# Patient Record
Sex: MALE | Race: WHITE | URBAN - METROPOLITAN AREA
[De-identification: names, ages, dates, MRNs, and addresses within clinical notes are randomized per-mention and may not be internally consistent; named-entity substitution may affect disease eponyms.]

---

## 2018-06-07 ENCOUNTER — OFFICE VISIT (OUTPATIENT)
Dept: PEDIATRICS | Facility: CLINIC | Age: 43
End: 2018-06-07
Payer: COMMERCIAL

## 2018-06-07 VITALS
HEART RATE: 69 BPM | TEMPERATURE: 98.6 F | WEIGHT: 241.2 LBS | DIASTOLIC BLOOD PRESSURE: 86 MMHG | SYSTOLIC BLOOD PRESSURE: 138 MMHG | OXYGEN SATURATION: 99 %

## 2018-06-07 DIAGNOSIS — L91.0 KELOID SCAR: Primary | ICD-10-CM

## 2018-06-07 DIAGNOSIS — Z13.1 SCREENING FOR DIABETES MELLITUS: ICD-10-CM

## 2018-06-07 DIAGNOSIS — B35.1 ONYCHOMYCOSIS: ICD-10-CM

## 2018-06-07 DIAGNOSIS — Z13.6 CARDIOVASCULAR SCREENING; LDL GOAL LESS THAN 160: ICD-10-CM

## 2018-06-07 PROCEDURE — 99203 OFFICE O/P NEW LOW 30 MIN: CPT | Performed by: NURSE PRACTITIONER

## 2018-06-07 RX ORDER — TERBINAFINE HYDROCHLORIDE 250 MG/1
250 TABLET ORAL DAILY
Qty: 90 TABLET | Refills: 0 | Status: SHIPPED | OUTPATIENT
Start: 2018-06-07
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
## 2018-06-07 NOTE — MR AVS SNAPSHOT
After Visit Summary   6/7/2018    Pablo Busch    MRN: [id number]           Patient Information     Date Of Birth          1975        Visit Information        Provider Department      6/7/2018 4:10 PM Rosalva Sanchez APRN CNP [facility]        Today's Diagnoses     Onychomycosis    -  1    Screening for diabetes mellitus        CARDIOVASCULAR SCREENING; LDL GOAL LESS THAN 160        Keloid scar          Care Instructions    PLAN:   1.   Orders Placed This Encounter   Medications     terbinafine (LAMISIL) 250 MG tablet     Sig: Take 1 tablet (250 mg) by mouth daily     Dispense:  90 tablet     Refill:  0     Orders Placed This Encounter   Procedures     Comprehensive metabolic panel     Lipid panel reflex to direct LDL Fasting     PLASTIC SURGERY REFERRAL       2. Patient needs to follow up in if no improvement,or sooner if worsening of symptoms or other symptoms develop.  CONSULTATION/REFERRAL to plastic surgery  Do not start on fungal med until after we call you about your labs    It was a pleasure seeing you today at the [facility]. Thank you for allowing us to care for you today. We truly hope we provided you with the excellent service you deserve. Please let us know if there is anything else we can do for you so we can be sure you are leaving completley satisfied with your care experience.       General information about your clinic   Clinic Hours Lab Hours (Appointments are required)   Mon-Thurs: 7:30 AM - 7 PM Mon-Thurs: 7:30 AM - 7 PM   Fri: 7:30 AM - 5 PM Fri: 7:30 AM - 5 PM        After Hours Nurse Advise & Appts:  [facility]: [phone]  [facility] On Call: to make appointments anytime: [phone] On Call Physician: call [phone] and answering service will page the on call physician.        For urgent appointments, please call [phone] and ask for the triage nurse or your care team clinic nurse.  How to  contact my care team:  [facility]: [email or web address]   Phone: [phone]   Fax: [phone]       [facility]:   Phone: [phone]  Hours: 8:00 AM - 6:00 PM  Medication Refills:  Call your pharmacy and they will forward the refill to us. Please allow 3 business days for your refills to be completed.       Normal or non-critical lab and imaging results will be communicated to you by MyChart, letter or phone within 7 days.  If you do not hear from us within 10 days, please call the clinic. If you have a critical or abnormal lab result, we will notify you by phone as soon as possible.       We now have PWIC (Pediatric Walk in Care)  Monday-Friday from 7:30-4. Simply walk in and be seen for your urgent needs like cough, fever, rash, diarrhea or vomiting, pink eye, UTI. No appointments needed. Ask one of the team for more information      -Your Care Team:    Dr. Vic Humphrey - Internal Medicine/Pediatrics   Dr. Luba Villalobos - Family Medicine  Dr. Chichi Russell - Pediatrics  Dr. Yari Hayes - Pediatrics  Rosalva Sanchez CNP - Family Practice Nurse Practitioner                           Follow-ups after your visit        Additional Services     PLASTIC SURGERY REFERRAL       Your provider has referred you to: [facility] [phone] [email or web address]    Please be aware that coverage of these services is subject to the terms and limitations of your health insurance plan.  Call member services at your health plan with any benefit or coverage questions.      Please bring the following with you to your appointment:    (1) Any X-Rays, CTs or MRIs which have been performed.  Contact the facility where they were done to arrange for  prior to your scheduled appointment.    (2) List of current medications  (3) This referral request   (4) Any documents/labs given to you  for this referral                  Who to contact     If you have questions or need follow up information about today's clinic visit or your schedule please contact [facility] directly at [phone].  Normal or non-critical lab and imaging results will be communicated to you by MyChart, letter or phone within 4 business days after the clinic has received the results. If you do not hear from us within 7 days, please contact the clinic through MyChart or phone. If you have a critical or abnormal lab result, we will notify you by phone as soon as possible.  Submit refill requests through [employer] or call your pharmacy and they will forward the refill request to us. Please allow 3 business days for your refill to be completed.          Additional Information About Your Visit        Care EveryWhere ID     This is your Care EveryWhere ID. This could be used by other organizations to access your [facility] medical records  [id number]        Your Vitals Were     Pulse Temperature Pulse Oximetry             69 98.6  F (37  C) (Temporal) 99%          Blood Pressure from Last 3 Encounters:   06/07/18 138/86    Weight from Last 3 Encounters:   06/07/18 241 lb 3.2 oz (109.4 kg)              We Performed the Following     Comprehensive metabolic panel     Lipid panel reflex to direct LDL Fasting     PLASTIC SURGERY REFERRAL          Today's Medication Changes          These changes are accurate as of 6/7/18  4:35 PM.  If you have any questions, ask your nurse or doctor.               Start taking these medicines.        Dose/Directions    terbinafine 250 MG tablet   Commonly known as:  lamISIL   Used for:  Onychomycosis   Started by:  Rosalva Sanchez APRN CNP        Dose:  250 mg   Take 1 tablet (250 mg) by mouth daily   Quantity:  90 tablet   Refills:  0            Where to get your medicines      These medications were sent to [facility] - [place], [place] - [address], [address]  [address], [address], [facility] [address]     Phone:  [phone]     terbinafine 250 MG tablet                Primary Care Provider Office Phone # Fax #    [facility] [phone]       [address]  [facility] [address]        Equal Access to Services     [facility] CenterVALERIA : Hadii aad ku hadasho Soomaali, waaxda luqadaha, qaybta kaalmada adeegyada, waxay idiin hayaan adeeg kharash la'aan ah. So [facility] [phone].    ATENCIÓN: Si habla español, tiene a hensley disposición servicios gratuitos de asistencia lingüística. Llame al [phone].    We comply with applicable federal civil rights laws and [place] laws. We do not discriminate on the basis of race, color, national origin, age, disability, sex, sexual orientation, or gender identity.            Thank you!     Thank you for choosing [facility]  for your care. Our goal is always to provide you with excellent care. Hearing back from our patients is one way we can continue to improve our services. Please take a few minutes to complete the written survey that you may receive in the mail after your visit with us. Thank you!             Your Updated Medication List - Protect others around you: Learn how to safely use, store and throw away your medicines at www.disposemymeds.org.          This list is accurate as of 6/7/18  4:35 PM.  Always use your most recent med list.                   Brand Name Dispense Instructions for use Diagnosis    terbinafine 250 MG tablet    lamISIL    90 tablet    Take 1 tablet (250 mg) by mouth daily    Onychomycosis

## 2018-06-07 NOTE — PATIENT INSTRUCTIONS
PLAN:   1.   Orders Placed This Encounter   Medications     terbinafine (LAMISIL) 250 MG tablet     Sig: Take 1 tablet (250 mg) by mouth daily     Dispense:  90 tablet     Refill:  0     Orders Placed This Encounter   Procedures     Comprehensive metabolic panel     Lipid panel reflex to direct LDL Fasting     PLASTIC SURGERY REFERRAL       2. Patient needs to follow up in if no improvement,or sooner if worsening of symptoms or other symptoms develop.  CONSULTATION/REFERRAL to plastic surgery  Do not start on fungal med until after we call you about your labs    It was a pleasure seeing you today at the [facility]. Thank you for allowing us to care for you today. We truly hope we provided you with the excellent service you deserve. Please let us know if there is anything else we can do for you so we can be sure you are leaving completley satisfied with your care experience.       General information about your clinic   Clinic Hours Lab Hours (Appointments are required)   Mon-Thurs: 7:30 AM - 7 PM Mon-Thurs: 7:30 AM - 7 PM   Fri: 7:30 AM - 5 PM Fri: 7:30 AM - 5 PM        After Hours Nurse Advise & Appts:  [facility]: [phone]  [facility] On Call: to make appointments anytime: [phone] On Call Physician: call [phone] and answering service will page the on call physician.        For urgent appointments, please call [phone] and ask for the triage nurse or your care team clinic nurse.  How to contact my care team:  [facility]: [email or web address]   Phone: [phone]   Fax: [phone]       [facility]:   Phone: [phone]  Hours: 8:00 AM - 6:00 PM  Medication Refills:  Call your pharmacy and they will forward the refill to us. Please allow 3 business days for your refills to be completed.       Normal or non-critical lab and imaging results will be communicated to you by MyChart, letter or phone within 7 days.  If you do not hear from us within 10  days, please call the clinic. If you have a critical or abnormal lab result, we will notify you by phone as soon as possible.       We now have PWIC (Pediatric Walk in Care)  Monday-Friday from 7:30-4. Simply walk in and be seen for your urgent needs like cough, fever, rash, diarrhea or vomiting, pink eye, UTI. No appointments needed. Ask one of the team for more information      -Your Care Team:    Dr. Vic Humphrey - Internal Medicine/Pediatrics   Dr. Luba Villalobos - Family Medicine  Dr. Chichi Russell - Pediatrics  Dr. Yari Hayes - Pediatrics  Rosalva Sanchez CNP - Family Practice Nurse Practitioner

## 2018-06-12 ENCOUNTER — TELEPHONE (OUTPATIENT)
Dept: PEDIATRICS | Facility: CLINIC | Age: 43
End: 2018-06-12

## 2018-06-12 DIAGNOSIS — B35.1 ONYCHOMYCOSIS: ICD-10-CM

## 2018-06-12 DIAGNOSIS — Z13.6 CARDIOVASCULAR SCREENING; LDL GOAL LESS THAN 160: ICD-10-CM

## 2018-06-12 DIAGNOSIS — L91.0 KELOID SCAR: ICD-10-CM

## 2018-06-12 DIAGNOSIS — Z13.1 SCREENING FOR DIABETES MELLITUS: ICD-10-CM

## 2018-06-12 DIAGNOSIS — Z79.899 ENCOUNTER FOR LONG-TERM (CURRENT) USE OF MEDICATIONS: Primary | ICD-10-CM

## 2018-06-12 LAB
ALBUMIN SERPL-MCNC: 4.2 G/DL (ref 3.4–5)
ALP SERPL-CCNC: 46 U/L (ref 40–150)
ALT SERPL W P-5'-P-CCNC: 68 U/L (ref 0–70)
ANION GAP SERPL CALCULATED.3IONS-SCNC: 7 MMOL/L (ref 3–14)
AST SERPL W P-5'-P-CCNC: 26 U/L (ref 0–45)
BILIRUB SERPL-MCNC: 0.6 MG/DL (ref 0.2–1.3)
BUN SERPL-MCNC: 16 MG/DL (ref 7–30)
CALCIUM SERPL-MCNC: 9.2 MG/DL (ref 8.5–10.1)
CHLORIDE SERPL-SCNC: 106 MMOL/L (ref 94–109)
CHOLEST SERPL-MCNC: 237 MG/DL
CO2 SERPL-SCNC: 29 MMOL/L (ref 20–32)
CREAT SERPL-MCNC: 0.85 MG/DL (ref 0.66–1.25)
GFR SERPL CREATININE-BSD FRML MDRD: >90 ML/MIN/1.7M2
GLUCOSE SERPL-MCNC: 96 MG/DL (ref 70–99)
HDLC SERPL-MCNC: 49 MG/DL
LDLC SERPL CALC-MCNC: 154 MG/DL
NONHDLC SERPL-MCNC: 188 MG/DL
POTASSIUM SERPL-SCNC: 4.8 MMOL/L (ref 3.4–5.3)
PROT SERPL-MCNC: 7.8 G/DL (ref 6.8–8.8)
SODIUM SERPL-SCNC: 142 MMOL/L (ref 133–144)
TRIGL SERPL-MCNC: 172 MG/DL

## 2018-06-12 PROCEDURE — 80061 LIPID PANEL: CPT | Performed by: NURSE PRACTITIONER

## 2018-06-12 PROCEDURE — 80053 COMPREHEN METABOLIC PANEL: CPT | Performed by: NURSE PRACTITIONER

## 2018-06-12 PROCEDURE — 36415 COLL VENOUS BLD VENIPUNCTURE: CPT | Performed by: NURSE PRACTITIONER

## 2018-06-12 NOTE — TELEPHONE ENCOUNTER
Notes Recorded by Rosalva Sanchez, BERTHA CNP on 6/12/2018 at 9:41 AM  Please call   -Liver and gallbladder tests are normal. (ALT,AST, Alk phos, bilirubin) ok to start medications. Recheck in 1 month.   , kidney function is normal (Cr, GFR), Sodium is normal, Potassium is normal, Calcium is normal, Glucose is normal (diabetes screening test).   -LDL(bad) cholesterol level is elevated,  A diet high in fat and simple carbohydrates, genetics and being overweight can contribute to this. ADVISE: Exercise, a low fat, low carbohydrate diet and weight control are helpful to improve this.  Rechecking your fasting cholesterol panel in 12 months is recommended (Lipid w/ LDL reflex, DX:hyperlipidemia)  Rosalva Sanchez, NP, APRN CNP          Ref Range & Units 7:32 AM       Sodium 133 - 144 mmol/L 142     Potassium 3.4 - 5.3 mmol/L 4.8     Chloride 94 - 109 mmol/L 106     Carbon Dioxide 20 - 32 mmol/L 29     Anion Gap 3 - 14 mmol/L 7     Glucose 70 - 99 mg/dL 96     Comments: Fasting specimen     Urea Nitrogen 7 - 30 mg/dL 16     Creatinine 0.66 - 1.25 mg/dL 0.85     GFR Estimate >60 mL/min/1.7m2 >90     Comments: Non  GFR Calc     GFR Estimate If Black >60 mL/min/1.7m2 >90     Comments:  GFR Calc     Calcium 8.5 - 10.1 mg/dL 9.2     Bilirubin Total 0.2 - 1.3 mg/dL 0.6     Albumin 3.4 - 5.0 g/dL 4.2     Protein Total 6.8 - 8.8 g/dL 7.8     Alkaline Phosphatase 40 - 150 U/L 46     ALT 0 - 70 U/L 68     AST 0 - 45 U/L 26     Resulting Agency  [facility]          Specimen Collected: 06/12/18  7:32 AM Last Resulted: 06/12/18  7:56 AM                                Lipid panel reflex to direct LDL Fasting   Status:  Final result   Visible to patient:  No (Not Released) Dx:  Keloid scar; Screening for diabetes m... Order: [id number]               Ref Range & Units 7:32 AM       Cholesterol <200 mg/dL 237 (H)     Comments: Desirable:       <200 mg/dl     Triglycerides <150 mg/dL 172 (H)     Comments:  Borderline high:  150-199 mg/dl   High:             200-499 mg/dl   Very high:       >499 mg/dl   Fasting specimen        HDL Cholesterol >39 mg/dL 49     LDL Cholesterol Calculated <100 mg/dL 154 (H)     Comments: Above desirable:  100-129 mg/dl   Borderline High:  130-159 mg/dL   High:             160-189 mg/dL   Very high:       >189 mg/dl        Non HDL Cholesterol <130 mg/dL 188 (H)     Comments: Above Desirable:  130-159 mg/dl   Borderline high:  160-189 mg/dl   High:             190-219 mg/dl   Very high:       >219 mg/dl        Resulting Agency  [facility]          Specimen Collected: 06/12/18  7:32 AM Last Resulted: 06/12/18  7:56 AM

## 2018-06-12 NOTE — TELEPHONE ENCOUNTER
Spoke with patient. Relayed results and recommendations from DANG Sanchez CNP. Patient verbalized understanding.     Helped patient to schedule 1 month lab follow up for hepatic panel.     No further questions at this time. Angie Ann RN

## 2018-07-03 ENCOUNTER — OFFICE VISIT (OUTPATIENT)
Dept: SURGERY | Facility: CLINIC | Age: 43
End: 2018-07-03
Attending: NURSE PRACTITIONER
Payer: COMMERCIAL

## 2018-07-03 VITALS — HEIGHT: 69 IN | BODY MASS INDEX: 34.44 KG/M2 | WEIGHT: 232.5 LBS

## 2018-07-03 DIAGNOSIS — L91.0 KELOID: ICD-10-CM

## 2018-07-03 PROCEDURE — 99202 OFFICE O/P NEW SF 15 MIN: CPT | Performed by: PLASTIC SURGERY

## 2018-07-03 ASSESSMENT — PAIN SCALES - GENERAL: PAINLEVEL: NO PAIN (0)

## 2018-07-03 NOTE — NURSING NOTE
Pablo Busch's goals for this visit include: consult for itchy keloid scar on chest  He requests these members of his care team be copied on today's visit information:     PCP: Jackie, [facility]    Referring Provider:  BERTHA Hunter CNP  [address]  [place] [address]    There were no vitals taken for this visit.    Do you need any medication refills at today's visit? Alisa Juarez LPN
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
## 2018-07-03 NOTE — NURSING NOTE
Per  pictures of keloid scar to be taken. Pictures taken of keloid scar to chest. Pt advised that pt will be contacted once PA is completed. Pt states understanding and has no questions at this time..Millie Velez RN
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
## 2018-07-05 ENCOUNTER — TELEPHONE (OUTPATIENT)
Dept: SURGERY | Facility: CLINIC | Age: 43
End: 2018-07-05

## 2018-07-09 ENCOUNTER — TELEPHONE (OUTPATIENT)
Dept: SURGERY | Facility: CLINIC | Age: 43
End: 2018-07-09

## 2018-07-09 NOTE — TELEPHONE ENCOUNTER
----- Message from AKOSUA Story MD sent at 7/3/2018  5:13 PM CDT -----  Regarding: DORINDA Scruggs,    Please PA for sternal keloid excision and steroid injection. Plan to do this either in clinic or ASC.    Let me know once you hear back and I will place orders.    So Heredia
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
## 2018-07-13 DIAGNOSIS — Z79.899 ENCOUNTER FOR LONG-TERM (CURRENT) USE OF MEDICATIONS: ICD-10-CM

## 2018-07-13 DIAGNOSIS — B35.1 ONYCHOMYCOSIS: ICD-10-CM

## 2018-07-13 LAB
ALBUMIN SERPL-MCNC: 4.4 G/DL (ref 3.4–5)
ALP SERPL-CCNC: 44 U/L (ref 40–150)
ALT SERPL W P-5'-P-CCNC: 39 U/L (ref 0–70)
AST SERPL W P-5'-P-CCNC: 17 U/L (ref 0–45)
BILIRUB DIRECT SERPL-MCNC: 0.1 MG/DL (ref 0–0.2)
BILIRUB SERPL-MCNC: 0.7 MG/DL (ref 0.2–1.3)
PROT SERPL-MCNC: 7.7 G/DL (ref 6.8–8.8)

## 2018-07-13 PROCEDURE — 80076 HEPATIC FUNCTION PANEL: CPT | Performed by: NURSE PRACTITIONER

## 2018-07-13 PROCEDURE — 36415 COLL VENOUS BLD VENIPUNCTURE: CPT | Performed by: NURSE PRACTITIONER

## 2018-07-13 NOTE — PROGRESS NOTES
Please call  Pablo Busch,    Attached are your test results.  -Liver and gallbladder tests (ALT,AST, Alk phos,bilirubin) are normal.  Recheck in 1 month    Please contact us if you have any questions.    Rosalva Sanchez, CNP

## 2018-07-13 NOTE — LETTER
July 13, 2018      Pablo Busch  [address]  [place] [address]              Dear Pablo,    Attached are your test results.   -Liver and gallbladder tests (ALT,AST, Alk phos,bilirubin) are normal.   Recheck in 1 month    Please contact us if you have any questions.     Rosalva Sanchez CNP     Results for orders placed or performed in visit on 07/13/18   Hepatic panel   Result Value Ref Range    Bilirubin Direct 0.1 0.0 - 0.2 mg/dL    Bilirubin Total 0.7 0.2 - 1.3 mg/dL    Albumin 4.4 3.4 - 5.0 g/dL    Protein Total 7.7 6.8 - 8.8 g/dL    Alkaline Phosphatase 44 40 - 150 U/L    ALT 39 0 - 70 U/L    AST 17 0 - 45 U/L

## 2018-07-18 NOTE — TELEPHONE ENCOUNTER
Per [employer], this services doesn't require a prior authorization per this plan, it tells us to referre to Medical Coverage Policy's for more info.     Spoke to Suki at [employer] insurance, she checked all CPT codes and ICD10 codes and came back that these are all valid and billable codes. Patient has a deductible/ out of pocket of $4000 family plan and had met $2999.20 so far for the 2018 benefit year. Call ref. #[id number] Suki  07/18/18 12:10pm    Spoke to Pablo, he is good with this info and would like to get this set up sooner then later, can someone call him back to get this scheduled with him. Thank you-  Sheba

## 2018-08-10 ENCOUNTER — OFFICE VISIT (OUTPATIENT)
Dept: SURGERY | Facility: CLINIC | Age: 43
End: 2018-08-10
Payer: COMMERCIAL

## 2018-08-10 VITALS — HEART RATE: 93 BPM | DIASTOLIC BLOOD PRESSURE: 85 MMHG | SYSTOLIC BLOOD PRESSURE: 123 MMHG

## 2018-08-10 DIAGNOSIS — L91.0 KELOID: Primary | ICD-10-CM

## 2018-08-10 PROCEDURE — 11406 EXC TR-EXT B9+MARG >4.0 CM: CPT | Performed by: PLASTIC SURGERY

## 2018-08-10 PROCEDURE — 96372 THER/PROPH/DIAG INJ SC/IM: CPT | Mod: 51 | Performed by: PLASTIC SURGERY

## 2018-08-10 PROCEDURE — 88305 TISSUE EXAM BY PATHOLOGIST: CPT | Mod: TC | Performed by: PLASTIC SURGERY

## 2018-08-10 NOTE — PROGRESS NOTES
PRESENTING COMPLAINT:  Followup visit for chest keloid.      HISTORY OF PRESENT ILLNESS:  Mr. Busch is 43 years old, here to have his keloid excised and injected with steroids.  No change in his history and physical exam.  All risks, benefits, alternatives of procedure were explained.  He understood them all and wants to proceed.       PROCEDURE NOTE:  After informed consent was taken from the patient, the proper site and procedure was ascertained with him and it was markedly, and he was taken to the procedure room.  His chest was clipped and prepped and draped in the standard surgical fashion.  The lesion was about 4.5 cm long.  I went ahead and injected 1% lidocaine with 1:100,000 epinephrine in the surgical area, waited 5 minutes for the medication to take effect, then excised the lesion right at the edge, such that it was a subtotal excision down and including the fatty tissue to remove the entire keloid.  Total length was about 4.5 cm, injected 80 mg of Kenalog in it and then closed the wound with a 3-0 Prolene suture in a running intracuticular manner such that it could be removed in 2 weeks.  Steri-Strips and Dermabond were placed.  The patient tolerated the procedure well.  All counts were correct at the end of the case.      FOLLOWUP:  I will see him back in 2 weeks to remove the sutures.  All questions were answered to his satisfaction.      Total time spent with patient 30 minutes, of which 20 minutes was procedure and more than half the remainder was counseling.

## 2018-08-10 NOTE — MR AVS SNAPSHOT
After Visit Summary   8/10/2018    Pablo Busch    MRN: [id number]           Patient Information     Date Of Birth          1975        Visit Information        Provider Department      8/10/2018 2:30 PM AKOSUA Story MD; PROC  [facility]        Today's Diagnoses     Keloid    -  1      Care Instructions    Excision Wound Care Instructions  I will experience scar, altered skin color, bleeding, swelling, pain, crusting and redness. I may experience altered sensation. Risks are excessive bleeding, infection, muscle weakness, thick (hypertrophic or keloidal) scar, and recurrence,. A second procedure may be recommended to obtain the best cosmetic or functional result.  After your surgery, Dermabond may be placed over the area that has sutures and a dry dressing. Please follow these instructions until you come back to clinic for suture removal on 8/24/18, as they will help you to prevent complications as your wound heals.  For the First 48 hours After Surgery:  1. If  pressure bandage used keep it on and keep it dry. If it should come loose, you may retape it, but do not take it off.  2. Relax and take it easy. Do not do any vigorous exercise, heavy lifting, or bending forward. This could cause the wound to bleed.  3. Post-operative pain is usually mild. You may take plain or extra strength Tylenol every 4 hours as needed (do not take more than 4,000mg in one day). Do not take any medicine that contains aspirin, ibuprofen or motrin unless you have been recommended these by a doctor.  Avoid alcohol and vitamin E as these may increase your tendency to bleed.  4. You may put an ice pack around the bandaged area for 20 minutes every 2-3 hours. This may help reduce swelling, bruising, and pain. Make sure the ice pack is waterproof so that the pressure bandage does not get wet.   48 Hours After Surgery  If dressing is present carefully remove. You may also now shower and  get the wound wet. Wash wound with a mild soap and water.  Use caution when washing the wound. Be gentle and do not let the forceful shower stream hit the wound directly.  PAT dry.  Daily Wound Care:  1. Wash wound with a mild soap and water.  Use caution when washing the wound, be gentle and do not let the forceful shower stream hit the wound directly.  2. PAT DRY.  3. After one week start moisturizing the site with Vaseline or Aquafore  4. No tub soaking, bathing or swimming while sutures are in place or until after follow up appt.      Call Us If:  1. You have pain that is not controlled with Tylenol.  2. You have signs or symptoms of an infection, such as: fever over 100 degrees F, redness, warmth, or foul-smelling or yellow/creamy drainage from the wound.  Who should I call with questions?    [facility]: [phone]     [facility]: [phone]    For urgent needs outside of business hours call the [facility] at [phone] and ask for the dermatology resident on call              Follow-ups after your visit        Follow-up notes from your care team     Return in about 2 weeks (around 8/24/2018).      Your next 10 appointments already scheduled     Aug 17, 2018  9:40 AM CDT   LAB with LAB FIRST FLOOR [facility] ([facility])    [address] [place] [address] [phone]           Please do not eat 10-12 hours before your appointment if you are coming in fasting for labs on lipids, cholesterol, or glucose (sugar). This does not apply to pregnant women. Water, hot tea and black coffee (with nothing added) are okay. Do not drink other fluids, diet soda or chew gum.            Aug 24, 2018  3:30 PM CDT   Return Visit with AKOSUA Story MD   [facility]    [address] [place] [address] [phone]               Who to contact     If you have questions or need follow up information about today's clinic visit or your schedule please contact [facility] directly at [phone].  Normal or non-critical lab and imaging results will be communicated to you by MyChart, letter or phone within 4 business days after the clinic has received the results. If you do not hear from us within 7 days, please contact the clinic through MyChart or phone. If you have a critical or abnormal lab result, we will notify you by phone as soon as possible.  Submit refill requests through [email or web address] or call your pharmacy and they will forward the refill request to us. Please allow 3 business days for your refill to be completed.          Additional Information About Your Visit        Care EveryWhere ID     This is your Care EveryWhere ID. This could be used by other organizations to access your [place] medical records  [id number]        Your Vitals Were     Pulse                   93            Blood Pressure from Last 3 Encounters:   08/10/18 123/85   06/07/18 138/86    Weight from Last 3 Encounters:   07/03/18 105.5 kg (232 lb 8 oz)   06/07/18 109.4 kg (241 lb 3.2 oz)              We Performed the Following     EXC BENIGN SKIN LESION TRUNK/ARM/LEG >4 CM     INTRALESIONAL CHEMO ADMIN,<8 LESN        Primary Care Provider Office Phone # Fax #    [facility] [phone]       [address]  [facility] [address]        Equal Access to Services     YNES BARRIOS : Hadii aad ku hadasho Soomaali, waaxda luqadaha, qaybta kaalmada adeegyada, telma salgado hayrobbien ephraim hannon . So [facility] [phone].    ATENCIÓN: Si habla español, tiene a hensley disposición servicios gratuitos de asistencia lingüística. Seble al [phone].    We comply with applicable federal civil rights laws and [place] laws. We do not discriminate on the basis of race, color, national origin, age, disability, sex, sexual  orientation, or gender identity.            Thank you!     Thank you for choosing [facility]  for your care. Our goal is always to provide you with excellent care. Hearing back from our patients is one way we can continue to improve our services. Please take a few minutes to complete the written survey that you may receive in the mail after your visit with us. Thank you!             Your Updated Medication List - Protect others around you: Learn how to safely use, store and throw away your medicines at www.disposemymeds.org.          This list is accurate as of 8/10/18  3:03 PM.  Always use your most recent med list.                   Brand Name Dispense Instructions for use Diagnosis    terbinafine 250 MG tablet    lamISIL    90 tablet    Take 1 tablet (250 mg) by mouth daily    Onychomycosis

## 2018-08-10 NOTE — NURSING NOTE
Pablo Busch's goals for this visit include: excision  He requests these members of his care team be copied on today's visit information: no    PCP: [facility]    Referring Provider:  No referring provider defined for this encounter.    There were no vitals taken for this visit.    Do you need any medication refills at today's visit? No    Daphne Arzoal LPN

## 2018-08-10 NOTE — LETTER
8/10/2018         RE: Pablo Busch  [address]  [place] [address]        Dear Colleague,    Thank you for referring your patient, Pablo Busch, to the [facility]. Please see a copy of my visit note below.    PRESENTING COMPLAINT:  Followup visit for chest keloid.      HISTORY OF PRESENT ILLNESS:  Mr. Busch is 43 years old, here to have his keloid excised and injected with steroids.  No change in his history and physical exam.  All risks, benefits, alternatives of procedure were explained.  He understood them all and wants to proceed.       PROCEDURE NOTE:  After informed consent was taken from the patient, the proper site and procedure was ascertained with him and it was markedly, and he was taken to the procedure room.  His chest was clipped and prepped and draped in the standard surgical fashion.  The lesion was about 4.5 cm long.  I went ahead and injected 1% lidocaine with 1:100,000 epinephrine in the surgical area, waited 5 minutes for the medication to take effect, then excised the lesion right at the edge, such that it was a subtotal excision down and including the fatty tissue to remove the entire keloid.  Total length was about 4.5 cm, injected 80 mg of Kenalog in it and then closed the wound with a 3-0 Prolene suture in a running intracuticular manner such that it could be removed in 2 weeks.  Steri-Strips and Dermabond were placed.  The patient tolerated the procedure well.  All counts were correct at the end of the case.      FOLLOWUP:  I will see him back in 2 weeks to remove the sutures.  All questions were answered to his satisfaction.      Total time spent with patient 30 minutes, of which 20 minutes was procedure and more than half the remainder was counseling.         Again, thank you for allowing me to participate in the care of your patient.        Sincerely,        AKOSUA Story MD

## 2018-08-10 NOTE — PATIENT INSTRUCTIONS
Excision Wound Care Instructions  I will experience scar, altered skin color, bleeding, swelling, pain, crusting and redness. I may experience altered sensation. Risks are excessive bleeding, infection, muscle weakness, thick (hypertrophic or keloidal) scar, and recurrence,. A second procedure may be recommended to obtain the best cosmetic or functional result.  After your surgery, Dermabond may be placed over the area that has sutures and a dry dressing. Please follow these instructions until you come back to clinic for suture removal on 8/24/18, as they will help you to prevent complications as your wound heals.  For the First 48 hours After Surgery:  1. If  pressure bandage used keep it on and keep it dry. If it should come loose, you may retape it, but do not take it off.  2. Relax and take it easy. Do not do any vigorous exercise, heavy lifting, or bending forward. This could cause the wound to bleed.  3. Post-operative pain is usually mild. You may take plain or extra strength Tylenol every 4 hours as needed (do not take more than 4,000mg in one day). Do not take any medicine that contains aspirin, ibuprofen or motrin unless you have been recommended these by a doctor.  Avoid alcohol and vitamin E as these may increase your tendency to bleed.  4. You may put an ice pack around the bandaged area for 20 minutes every 2-3 hours. This may help reduce swelling, bruising, and pain. Make sure the ice pack is waterproof so that the pressure bandage does not get wet.   48 Hours After Surgery  If dressing is present carefully remove. You may also now shower and get the wound wet. Wash wound with a mild soap and water.  Use caution when washing the wound. Be gentle and do not let the forceful shower stream hit the wound directly.  PAT dry.  Daily Wound Care:  1. Wash wound with a mild soap and water.  Use caution when washing the wound, be gentle and do not let the forceful shower stream hit the wound directly.  2. PAT  DRY.  3. After one week start moisturizing the site with Vaseline or Aquafore  4. No tub soaking, bathing or swimming while sutures are in place or until after follow up appt.      Call Us If:  1. You have pain that is not controlled with Tylenol.  2. You have signs or symptoms of an infection, such as: fever over 100 degrees F, redness, warmth, or foul-smelling or yellow/creamy drainage from the wound.  Who should I call with questions?    [facility]: [phone]     [facility]: [phone]    For urgent needs outside of business hours call the [facility] at [phone] and ask for the dermatology resident on call

## 2018-08-17 DIAGNOSIS — B35.1 ONYCHOMYCOSIS: ICD-10-CM

## 2018-08-17 DIAGNOSIS — Z79.899 ENCOUNTER FOR LONG-TERM (CURRENT) USE OF MEDICATIONS: ICD-10-CM

## 2018-08-17 LAB
ALBUMIN SERPL-MCNC: 4.5 G/DL (ref 3.4–5)
ALP SERPL-CCNC: 47 U/L (ref 40–150)
ALT SERPL W P-5'-P-CCNC: 29 U/L (ref 0–70)
AST SERPL W P-5'-P-CCNC: 13 U/L (ref 0–45)
BILIRUB DIRECT SERPL-MCNC: 0.2 MG/DL (ref 0–0.2)
BILIRUB SERPL-MCNC: 0.8 MG/DL (ref 0.2–1.3)
PROT SERPL-MCNC: 7.7 G/DL (ref 6.8–8.8)

## 2018-08-17 PROCEDURE — 36415 COLL VENOUS BLD VENIPUNCTURE: CPT | Performed by: NURSE PRACTITIONER

## 2018-08-17 PROCEDURE — 80076 HEPATIC FUNCTION PANEL: CPT | Performed by: NURSE PRACTITIONER

## 2018-08-20 ENCOUNTER — TELEPHONE (OUTPATIENT)
Dept: PEDIATRICS | Facility: CLINIC | Age: 43
End: 2018-08-20

## 2018-08-20 LAB — COPATH REPORT: NORMAL

## 2018-08-20 NOTE — TELEPHONE ENCOUNTER
Notes Recorded by Rosalva Sanchez, BERTHA FLETCHER on 8/19/2018 at 1:14 AM  Please call   -Liver and gallbladder tests (ALT,AST, Alk phos,bilirubin) are normal.  Recheck in 1 month   Rosalva Sanchez NP, BERTHA CNP    Ref Range & Units 3d ago   1mo ago   2mo ago          Bilirubin Direct 0.0 - 0.2 mg/dL 0.2 0.1       Bilirubin Total 0.2 - 1.3 mg/dL 0.8 0.7 0.6      Albumin 3.4 - 5.0 g/dL 4.5 4.4 4.2      Protein Total 6.8 - 8.8 g/dL 7.7 7.7 7.8      Alkaline Phosphatase 40 - 150 U/L 47 44 46      ALT 0 - 70 U/L 29 39 68      AST 0 - 45 U/L 13 17 26     Resulting Agency  [facility]          Specimen Collected: 08/17/18 11:21 AM Last Resulted: 08/17/18 11:44 AM

## 2018-08-20 NOTE — TELEPHONE ENCOUNTER
Patient advised of information below per Rosalva Sanchez.  Patient stated understanding.    Courtney Mera CMA

## 2018-08-24 ENCOUNTER — OFFICE VISIT (OUTPATIENT)
Dept: SURGERY | Facility: CLINIC | Age: 43
End: 2018-08-24
Payer: COMMERCIAL

## 2018-08-24 DIAGNOSIS — L91.0 KELOID: Primary | ICD-10-CM

## 2018-08-24 PROCEDURE — 99212 OFFICE O/P EST SF 10 MIN: CPT | Performed by: PLASTIC SURGERY

## 2018-08-24 ASSESSMENT — PAIN SCALES - GENERAL: PAINLEVEL: NO PAIN (0)

## 2018-08-24 NOTE — MR AVS SNAPSHOT
After Visit Summary   8/24/2018    Pablo Busch    MRN: [id number]           Patient Information     Date Of Birth          1975        Visit Information        Provider Department      8/24/2018 3:30 PM AKOSUA Story MD [facility]        Today's Diagnoses     Keloid    -  1       Follow-ups after your visit        Follow-up notes from your care team     Return if symptoms worsen or fail to improve.      Who to contact     If you have questions or need follow up information about today's clinic visit or your schedule please contact [facility] directly at [phone].  Normal or non-critical lab and imaging results will be communicated to you by MyChart, letter or phone within 4 business days after the clinic has received the results. If you do not hear from us within 7 days, please contact the clinic through MyChart or phone. If you have a critical or abnormal lab result, we will notify you by phone as soon as possible.  Submit refill requests through [employer] or call your pharmacy and they will forward the refill request to us. Please allow 3 business days for your refill to be completed.          Additional Information About Your Visit        Care EveryWhere ID     This is your Care EveryWhere ID. This could be used by other organizations to access your [place] medical records  [id number]         Blood Pressure from Last 3 Encounters:   08/10/18 123/85   06/07/18 138/86    Weight from Last 3 Encounters:   07/03/18 232 lb 8 oz   06/07/18 241 lb 3.2 oz              Today, you had the following     No orders found for display       Primary Care Provider Office Phone # Fax #    [facility] [place] [phone]       [address] [facility] [address]        Equal Access to Services     YNES BARRIOS : Sonia Calix, jorge fischer, telma stout .  So [facility] [phone].    ATENCIÓN: Si habla vitor, tiene a hensley disposición servicios gratuitos de asistencia lingüística. Seble al [phone].    We comply with applicable federal civil rights laws and [place] laws. We do not discriminate on the basis of race, color, national origin, age, disability, sex, sexual orientation, or gender identity.            Thank you!     Thank you for choosing [facility]  for your care. Our goal is always to provide you with excellent care. Hearing back from our patients is one way we can continue to improve our services. Please take a few minutes to complete the written survey that you may receive in the mail after your visit with us. Thank you!             Your Updated Medication List - Protect others around you: Learn how to safely use, store and throw away your medicines at www.disposemymeds.org.          This list is accurate as of 8/24/18 11:59 PM.  Always use your most recent med list.                   Brand Name Dispense Instructions for use Diagnosis    terbinafine 250 MG tablet    lamISIL    90 tablet    Take 1 tablet (250 mg) by mouth daily    Onychomycosis

## 2018-08-24 NOTE — LETTER
8/24/2018         RE: Pablo Busch  [address]  [place] [address]        Dear Colleague,    Thank you for referring your patient, Pablo Busch, to the [facility]. Please see a copy of my visit note below.    PRESENTING COMPLAINT:  Followup visit after undergoing excision of a keloid on his chest with steroid injection done on 08/10/2018 in clinic.      HISTORY OF PRESENT ILLNESS:  Mr. Busch is 43 years old.  He is a couple weeks out from his procedure.  He has done very well and is very happy with the results.  No itching.  He feels well.  Pathology was a benign keloid.      PHYSICAL EXAMINATION:  Vital signs stable.  He is afebrile, in no obvious distress.  On examination of his incision, it is healing in well.  No evidence for recurrence.  No evidence for infection.      ASSESSMENT AND PLAN:  Based on above findings, a diagnosis of keloid excision with steroid injection was made.  I took out the sutures.  I have asked him to thoroughly moisturize and continue to keep an eye on it.  If it starts to recur, he should come back to clinic for further steroid injections.  He understood.  All questions were answered.  He was happy with the visit.        Total time spent with the patient was 10 minutes with more than half in counseling.         Again, thank you for allowing me to participate in the care of your patient.        Sincerely,        AKOSUA Story MD

## 2018-08-24 NOTE — PROGRESS NOTES
PRESENTING COMPLAINT:  Followup visit after undergoing excision of a keloid on his chest with steroid injection done on 08/10/2018 in clinic.      HISTORY OF PRESENT ILLNESS:  Mr. Busch is 43 years old.  He is a couple weeks out from his procedure.  He has done very well and is very happy with the results.  No itching.  He feels well.  Pathology was a benign keloid.      PHYSICAL EXAMINATION:  Vital signs stable.  He is afebrile, in no obvious distress.  On examination of his incision, it is healing in well.  No evidence for recurrence.  No evidence for infection.      ASSESSMENT AND PLAN:  Based on above findings, a diagnosis of keloid excision with steroid injection was made.  I took out the sutures.  I have asked him to thoroughly moisturize and continue to keep an eye on it.  If it starts to recur, he should come back to clinic for further steroid injections.  He understood.  All questions were answered.  He was happy with the visit.        Total time spent with the patient was 10 minutes with more than half in counseling.

## 2018-08-24 NOTE — NURSING NOTE
Pablo Busch's goals for this visit include:   Chief Complaint   Patient presents with     RECHECK     Pt states ends of scar are slightly irritated. Other than that, no concerns.        He requests these members of his care team be copied on today's visit information:     PCP: [facility]    Referring Provider:  No referring provider defined for this encounter.    There were no vitals taken for this visit.    Do you need any medication refills at today's visit? No.    Izzy Styles LPN

## 2018-09-25 NOTE — TELEPHONE ENCOUNTER
Per [employer], this services doesn't require a prior authorization per this plan, it tells us to referre to Medical Coverage Policy's for more info.      Spoke to Suki at [employer], she checked all CPT codes and ICD10 codes and came back that these are all valid and billable codes. Patient has a deductible/ out of pocket of $4000 family plan and had met $2999.20 so far for the 2018 benefit year. Call ref. #[id number] Suki  07/18/18 12:10pm     Spoke to Pablo, he is good with this info and would like to get this set up sooner then later, can someone call him back to get this scheduled with him. Thank you-  Sheba

## 2018-10-02 ENCOUNTER — OFFICE VISIT (OUTPATIENT)
Dept: SURGERY | Facility: CLINIC | Age: 43
End: 2018-10-02
Payer: COMMERCIAL

## 2018-10-02 DIAGNOSIS — L91.0 KELOID: Primary | ICD-10-CM

## 2018-10-02 PROCEDURE — 96372 THER/PROPH/DIAG INJ SC/IM: CPT | Performed by: PLASTIC SURGERY

## 2018-10-02 NOTE — PROGRESS NOTES
PRESENTING COMPLAINT:  Followup visit after undergoing excision of keloid on the chest with steroid injection done 08/10/2018 in clinic.      HISTORY OF PRESENTING COMPLAINT:  Mr. Busch is 43 years old, well-known to my service.  He is about 6 weeks out from his procedure.  Overall completely healed, has a couple of areas on the scar that seem to be growing, but otherwise is very happy with the results.      PHYSICAL EXAMINATION:  Vital signs are stable.  He is afebrile, in no obvious distress.  He has healed in completely.  He has a couple of nodules that are firmer, 1 on the right lateral aspect, 1 on the left lateral aspect.  No evidence for the rest of the scar recurring.      ASSESSMENT AND PLAN:  Based upon above findings, a diagnosis of keloid excision and steroid injection was made.  Under sterile conditions, I injected about 0.2 mL of Kenalog 40 in each nodule.  I will see him back in 4-6 weeks for repeat injection if needed.  All questions were answered.  He was happy with his visit.      Total time spent with patient 10 minutes, more than half was counseling.

## 2018-10-02 NOTE — LETTER
10/2/2018         RE: Pablo Busch  [id number]  [place] [address]        Dear Colleague,    Thank you for referring your patient, Pablo Busch, to the [facility]. Please see a copy of my visit note below.    PRESENTING COMPLAINT:  Followup visit after undergoing excision of keloid on the chest with steroid injection done 08/10/2018 in clinic.      HISTORY OF PRESENTING COMPLAINT:  Mr. Busch is 43 years old, well-known to my service.  He is about 6 weeks out from his procedure.  Overall completely healed, has a couple of areas on the scar that seem to be growing, but otherwise is very happy with the results.      PHYSICAL EXAMINATION:  Vital signs are stable.  He is afebrile, in no obvious distress.  He has healed in completely.  He has a couple of nodules that are firmer, 1 on the right lateral aspect, 1 on the left lateral aspect.  No evidence for the rest of the scar recurring.      ASSESSMENT AND PLAN:  Based upon above findings, a diagnosis of keloid excision and steroid injection was made.  Under sterile conditions, I injected about 0.2 mL of Kenalog 40 in each nodule.  I will see him back in 4-6 weeks for repeat injection if needed.  All questions were answered.  He was happy with his visit.      Total time spent with patient 10 minutes, more than half was counseling.         Again, thank you for allowing me to participate in the care of your patient.        Sincerely,        AKOSUA Story MD

## 2018-10-02 NOTE — NURSING NOTE
Pablo Busch's goals for this visit include:   Chief Complaint   Patient presents with     RECHECK     wound check        He requests these members of his care team be copied on today's visit information: yes     PCP: Charleen Mejia [facility]    Referring Provider:  No referring provider defined for this encounter.    There were no vitals taken for this visit.    Do you need any medication refills at today's visit? No     Amorrae MALA Shaw